# Patient Record
Sex: FEMALE | Race: WHITE
[De-identification: names, ages, dates, MRNs, and addresses within clinical notes are randomized per-mention and may not be internally consistent; named-entity substitution may affect disease eponyms.]

---

## 2018-05-06 ENCOUNTER — HOSPITAL ENCOUNTER (INPATIENT)
Dept: HOSPITAL 68 - ERH | Age: 53
LOS: 2 days | DRG: 517 | End: 2018-05-08
Attending: INTERNAL MEDICINE | Admitting: INTERNAL MEDICINE
Payer: COMMERCIAL

## 2018-05-06 VITALS — WEIGHT: 232 LBS | BODY MASS INDEX: 38.65 KG/M2 | HEIGHT: 65 IN

## 2018-05-06 DIAGNOSIS — R55: ICD-10-CM

## 2018-05-06 DIAGNOSIS — K21.9: ICD-10-CM

## 2018-05-06 DIAGNOSIS — D25.9: ICD-10-CM

## 2018-05-06 DIAGNOSIS — Z90.49: ICD-10-CM

## 2018-05-06 DIAGNOSIS — I95.9: ICD-10-CM

## 2018-05-06 DIAGNOSIS — D62: ICD-10-CM

## 2018-05-06 DIAGNOSIS — F32.9: ICD-10-CM

## 2018-05-06 DIAGNOSIS — I10: ICD-10-CM

## 2018-05-06 DIAGNOSIS — F41.9: ICD-10-CM

## 2018-05-06 DIAGNOSIS — E78.5: ICD-10-CM

## 2018-05-06 DIAGNOSIS — K76.0: ICD-10-CM

## 2018-05-06 DIAGNOSIS — N92.0: Primary | ICD-10-CM

## 2018-05-06 LAB
ABSOLUTE GRANULOCYTE CT: 5.9 /CUMM (ref 1.4–6.5)
APTT BLD: 27 SEC (ref 25–37)
BASOPHILS # BLD: 0 /CUMM (ref 0–0.2)
BASOPHILS NFR BLD: 0.4 % (ref 0–2)
EOSINOPHIL # BLD: 0.1 /CUMM (ref 0–0.7)
EOSINOPHIL NFR BLD: 0.7 % (ref 0–5)
ERYTHROCYTE [DISTWIDTH] IN BLOOD BY AUTOMATED COUNT: 14.8 % (ref 11.5–14.5)
GRANULOCYTES NFR BLD: 60.9 % (ref 42.2–75.2)
HCT VFR BLD CALC: 37.1 % (ref 37–47)
LYMPHOCYTES # BLD: 3.1 /CUMM (ref 1.2–3.4)
MCH RBC QN AUTO: 27.9 PG (ref 27–31)
MCHC RBC AUTO-ENTMCNC: 32.3 G/DL (ref 33–37)
MCV RBC AUTO: 86.4 FL (ref 81–99)
MONOCYTES # BLD: 0.5 /CUMM (ref 0.1–0.6)
PLATELET # BLD: 177 /CUMM (ref 130–400)
PMV BLD AUTO: 10.1 FL (ref 7.4–10.4)
PROTHROMBIN TIME: 12.1 SEC (ref 9.4–12.5)
RED BLOOD CELL CT: 4.3 /CUMM (ref 4.2–5.4)
WBC # BLD AUTO: 9.6 /CUMM (ref 4.8–10.8)

## 2018-05-06 PROCEDURE — P9016 RBC LEUKOCYTES REDUCED: HCPCS

## 2018-05-06 PROCEDURE — 0UDB7ZZ EXTRACTION OF ENDOMETRIUM, VIA NATURAL OR ARTIFICIAL OPENING: ICD-10-PCS | Performed by: OBSTETRICS & GYNECOLOGY

## 2018-05-06 NOTE — CT SCAN REPORT
EXAMINATION:
CT HEAD WITHOUT CONTRAST
 
CLINICAL INFORMATION:
Mental status change.
 
COMPARISON:
None
 
TECHNIQUE:
Contiguous axial imaging was performed from the skull base to vertex without
intravenous administration of contrast.
 
DLP:
633 mGy-cm
 
FINDINGS:
There is no evidence of acute intracranial hemorrhage or territorial
infarction. No abnormal mass effect or midline shift is seen. Gray to white
matter differentiation is well preserved. No extra-axial fluid collections
are identified.
 
The ventricles are normal in size. There is no significant abnormal
attenuation within the brain parenchyma. The osseous structures and soft
tissues demonstrate no acute abnormalities. The mastoid air cells and
visualized portions of the paranasal sinuses are well aerated.
 
IMPRESSION:
No acute intracranial pathology. No CT explanation for the patient's mental
status change.

## 2018-05-06 NOTE — ED SYNCOPE COMPLAINT
History of Present Illness
 
General
Chief Complaint: Syncope and Near-Syncope
Stated Complaint: BIBA SYNCOPE
Source: patient, family, EMS
Exam Limitations: no limitations
 
Vital Signs & Intake/Output
Vital Signs & Intake/Output
 Vital Signs
 
 
Date Time Temp Pulse Resp B/P B/P Pulse O2 O2 Flow FiO2
 
     Mean Ox Delivery Rate 
 
4       Room Air  
 
 2200 97.8 70 20 109/55  97 Room Air  
 
 
 ED Intake and Output
 
 
  0000  1200
 
Intake Total  
 
Output Total  
 
Balance  
 
   
 
Patient 240 lb 
 
Weight  
 
Weight Reported by Patient 
 
Measurement  
 
Method  
 
 
 
Allergies
Coded Allergies:
No Known Allergies (18)
 
Reconcile Medications
Atorvastatin Calcium 40 MG TABLET   1 TAB PO DAILY HLD  (Reported)
Desvenlafaxine Succinate (Pristiq ER) 50 MG TAB.ER.24H   1 TAB PO DAILY 
DEPRESSION  (Reported)
Pantoprazole Sodium 40 MG TABLET.DR   1 TAB PO DAILY GERD  (Reported)
Ranitidine (Ranitidine HCl) 150 MG TABLET   1 TAB PO BID GERD  (Reported)
 
Triage Nurses Notes Reviewed? yes
Timing: recent history
Precipitating Factors: "My period has been really heavy... started yesterday"
Context: sitting on sofa
Episode Description:
see below
Loss of Consciousness: brief (seconds)
Associated Symptoms: dizziness, nausea/vomiting, weakness
Pregnant: No
Patient currently breastfeeds: No
HPI:
54 yo woman
in prior good health
presents with syncope.
 
Per her , "we were sitting on the sofa and she just passed out."  She did
not stop breathing.  No cyanosis.  The event lasted a few minutes.  He called 
911.
 
She notes that she had a period start last night, "I am having a heavy period...
clots are coming out."
 
911 called.  When the ems crew went to have her stand, she became pale and 
briefly syncoped.  's in the field.
 
She has no recollection of the first syncopal event.  She does not recall 
feeling dizzy, chest pain, palpitations, dyspnea. 
 
She reports no pain and is otherwise well. 
 
Past History
 
Travel History
Traveled to [place] past 21 day No
 
Medical History
Any Pertinent Medical History? see below for history
 
Surgical History
Surgical History: none
 
Family History
Hx Contributory? No
 
Review of Systems
 
Review of Systems
Constitutional:
Reports: no symptoms. 
EENTM:
Reports: no symptoms. 
Respiratory:
Reports: no symptoms. 
Cardiovascular:
Reports: no symptoms. 
GI:
Reports: no symptoms. 
Genitourinary:
Reports: no symptoms. 
Musculoskeletal:
Reports: no symptoms. 
Skin:
Reports: no symptoms. 
Neurological/Psychological:
Reports: no symptoms. 
All Other Systems: Reviewed and Negative
 
Physical Exam
 
Physical Exam
General Appearance: well developed/nourished, no apparent distress
Head: atraumatic, normal appearance
Eyes:
Bilateral: normal appearance. 
Ears, Nose, Throat: normal pharynx, normal ENT inspection
Neck: normal inspection, supple, full range of motion
Respiratory: normal breath sounds, chest non-tender, no respiratory distress, 
quiet respiration, lungs clear
Cardiovascular: regular rate/rhythm
Gastrointestinal: normal bowel sounds, soft, non-tender
Back: normal inspection
Extremities: normal inspection
Psychiatric: awake, alert, oriented x 3
Cranial Nerves: normal hearing, normal speech
Skin: intact, normal color, warm/dry
 
Core Measures
ACS in differential dx? No
CVA/TIA Diagnosis: No
Sepsis Present: No
Sepsis Focused Exam Completed? No
 
Progress
Differential Diagnosis: vasodepressor syncope, anemia vs other. 
Plan of Care:
 Orders
 
 
Procedure Date/time Status
 
Nothing by Mouth  B Active
 
Patient Data 2329 Active
 
Saline Lock 2320 Active
 
Misc Message 2320 Active
 
ED Holding Orders 2320 Active
 
Admit to inpatient 2320 Active
 
Vital Signs 2320 Active
 
Code Status 2320 Active
 
Intake & Output 2234 Active
 
TROPONIN LEVEL 2147 Complete
 
PARTIAL THROMBOPLASTIN TIME 2147 Complete
 
PROTHROMBIN TIME 2147 Complete
 
LIPASE 2147 Complete
 
HEPATIC FUNCTION PANEL 2147 Complete
 
CBC WITHOUT DIFFERENTIAL 2147 Complete
 
BASIC METABOLIC PANEL 2147 Complete
 
AMYLASE 2147 Complete
 
EKG 2147 Active
 
TYPE & SCREEN (NOT X-MATCH) 2147 Complete
 
 
 Laboratory Tests
 
 
 
18:
Anion Gap 10, Estimated GFR > 60, BUN/Creatinine Ratio 21.7, Glucose 125  H, 
Calcium 9.0, Total Bilirubin 0.3, Direct Bilirubin 0.2, AST 27, ALT 35, Alkaline
Phosphatase 109, Troponin I < 0.01, Total Protein 6.1  L, Albumin 3.5, Amylase 
107, Lipase 218, PT 12.1, INR 1.11, APTT 27, CBC w Diff NO MAN DIFF REQ, RBC 
4.30, MCV 86.4, MCH 27.9, MCHC 32.3  L, RDW 14.8  H, MPV 10.1, Gran % 60.9, 
Lymphocytes % 32.5, Monocytes % 5.5, Eosinophils % 0.7, Basophils % 0.4, 
Absolute Granulocytes 5.9, Absolute Lymphocytes 3.1, Absolute Monocytes 0.5, 
Absolute Eosinophils 0.1, Absolute Basophils 0
 
Diagnostic Imaging:
Viewed by Me: Radiology Read, CT Scan.  Discussed w/RAD: Radiology Read, CT 
Scan. 
Radiology Impression: PATIENT: GONZALO OSULLIVAN  MEDICAL RECORD NO: [id number] 
PRESENT AGE: 53  PATIENT ACCOUNT NO: [id number] : 63  LOCATION: ER 
ORDERING PHYSICIAN: Dave Summers MD     SERVICE DATE:  
EXAM TYPE: CAT - CT HEAD WO IV CONTRAST EXAMINATION: CT HEAD WITHOUT CONTRAST 
CLINICAL INFORMATION: Mental status change. COMPARISON: None TECHNIQUE: 
Contiguous axial imaging was performed from the skull base to vertex without 
intravenous administration of contrast. DLP: 633 mGy-cm FINDINGS: There is no 
evidence of acute intracranial hemorrhage or territorial infarction. No abnormal
mass effect or midline shift is seen. Gray to white matter differentiation is 
well preserved. No extra-axial fluid collections are identified. The ventricles 
are normal in size. There is no significant abnormal attenuation within the 
brain parenchyma. The osseous structures and soft tissues demonstrate no acute 
abnormalities. The mastoid air cells and visualized portions of the paranasal 
sinuses are well aerated. IMPRESSION: No acute intracranial pathology. No CT 
explanation for the patient's mental status change. DICTATED BY: Moise Barajas MD  DATE/TIME DICTATED:18 :RAD.[facility]ARIO  DATE/TIME 
TRANSCRIBED:18 CONFIDENTIAL, DO NOT COPY WITHOUT APPROPRIATE 
AUTHORIZATION.  <Electronically signed in Other Vendor System>                  
                                                                     SIGNED BY: 
Moise Barajas MD 18
CXR Impression: PATIENT: GONZALO OSULLIVAN  MEDICAL RECORD NO: [id number] PRESENT 
AGE: 53  PATIENT ACCOUNT NO: [id number] : 61  LOCATION: [facility] ORDERING 
PHYSICIAN: Dave Summers MD     SERVICE DATE:  EXAM TYPE: 
RAD - XRY-PORTABLE CHEST XRAY EXAMINATION: XR PORTABLE CHEST CLINICAL 
INFORMATION: Chest pain COMPARISON: None TECHNIQUE: Portable frontal view of the
chest was obtained. FINDINGS: No significant abnormality is noted involving the 
heart, lungs, mediastinum, bony thorax or soft tissues. IMPRESSION: No acute 
pulmonary disease. DICTATED BY: Isidoro Dye MD  DATE/TIME DICTATED:18 :RAD.ROSARIO  DATE/TIME TRANSCRIBED:18 
CONFIDENTIAL, DO NOT COPY WITHOUT APPROPRIATE AUTHORIZATION.  <Electronically 
signed in Other Vendor System>                                                  
                                     SIGNED BY: Isidoro Dye MD 18 [id number]
Initial ED EKG: nsr, no acute changes. 
 
Departure
 
Departure
Disposition: STILL A PATIENT
Condition: Stable
Clinical Impression
Primary Impression: Syncope
Referrals:
Maciej Palomino MD (PCP/Family)
 
Departure Forms:
Customer Survey
General Discharge Information
Comments
18, 23:14... discussed with dr. ochoa (cards)... pt with syncope of 
uncertain etiology, merits admission, monitoring, serial ekg, cards eval. 
 
Admission Note
Spoke With:
Ale Simms MD
Documentation of Exam:
Documentation of any treatments & extenuating circumstances including Concerns 
Regarding Discharge (functional status, medication knowledge or non-compliance, 
living conditions, etc.) that warrant an admission rather than observation: 
 
pt with syncope of uncertain etiology, also with orthostasis, without 
compensatory pulse increase. 
pt merits monitoring, iv fluids, serial trop/ekg, cards eval... pt would also 
benefit from gyn eval for vaginal bleeding.

## 2018-05-06 NOTE — RADIOLOGY REPORT
EXAMINATION:
XR PORTABLE CHEST
 
CLINICAL INFORMATION:
Chest pain
 
COMPARISON:
None
 
TECHNIQUE:
Portable frontal view of the chest was obtained.
 
FINDINGS:
No significant abnormality is noted involving the heart, lungs, mediastinum,
bony thorax or soft tissues.
 
IMPRESSION:
No acute pulmonary disease.

## 2018-05-07 LAB
ABSOLUTE GRANULOCYTE CT: 10.3 /CUMM (ref 1.4–6.5)
ABSOLUTE GRANULOCYTE CT: 5.7 /CUMM (ref 1.4–6.5)
ABSOLUTE GRANULOCYTE CT: 6 /CUMM (ref 1.4–6.5)
BASOPHILS # BLD: 0 /CUMM (ref 0–0.2)
BASOPHILS NFR BLD: 0.3 % (ref 0–2)
BASOPHILS NFR BLD: 0.3 % (ref 0–2)
BASOPHILS NFR BLD: 0.4 % (ref 0–2)
EOSINOPHIL # BLD: 0 /CUMM (ref 0–0.7)
EOSINOPHIL NFR BLD: 0.1 % (ref 0–5)
EOSINOPHIL NFR BLD: 0.3 % (ref 0–5)
EOSINOPHIL NFR BLD: 0.3 % (ref 0–5)
ERYTHROCYTE [DISTWIDTH] IN BLOOD BY AUTOMATED COUNT: 14.3 % (ref 11.5–14.5)
ERYTHROCYTE [DISTWIDTH] IN BLOOD BY AUTOMATED COUNT: 14.3 % (ref 11.5–14.5)
ERYTHROCYTE [DISTWIDTH] IN BLOOD BY AUTOMATED COUNT: 14.5 % (ref 11.5–14.5)
GRANULOCYTES NFR BLD: 68.9 % (ref 42.2–75.2)
GRANULOCYTES NFR BLD: 69.4 % (ref 42.2–75.2)
GRANULOCYTES NFR BLD: 80.6 % (ref 42.2–75.2)
HCT VFR BLD CALC: 27.6 % (ref 37–47)
HCT VFR BLD CALC: 28.7 % (ref 37–47)
HCT VFR BLD CALC: 32.8 % (ref 37–47)
LYMPHOCYTES # BLD: 1.8 /CUMM (ref 1.2–3.4)
LYMPHOCYTES # BLD: 2 /CUMM (ref 1.2–3.4)
LYMPHOCYTES # BLD: 2 /CUMM (ref 1.2–3.4)
MCH RBC QN AUTO: 28 PG (ref 27–31)
MCH RBC QN AUTO: 28.3 PG (ref 27–31)
MCH RBC QN AUTO: 28.3 PG (ref 27–31)
MCHC RBC AUTO-ENTMCNC: 32.3 G/DL (ref 33–37)
MCHC RBC AUTO-ENTMCNC: 32.6 G/DL (ref 33–37)
MCHC RBC AUTO-ENTMCNC: 32.6 G/DL (ref 33–37)
MCV RBC AUTO: 86.6 FL (ref 81–99)
MCV RBC AUTO: 86.7 FL (ref 81–99)
MCV RBC AUTO: 86.8 FL (ref 81–99)
MONOCYTES # BLD: 0.5 /CUMM (ref 0.1–0.6)
MONOCYTES # BLD: 0.5 /CUMM (ref 0.1–0.6)
MONOCYTES # BLD: 0.6 /CUMM (ref 0.1–0.6)
PLATELET # BLD: 142 /CUMM (ref 130–400)
PLATELET # BLD: 145 /CUMM (ref 130–400)
PLATELET # BLD: 162 /CUMM (ref 130–400)
PMV BLD AUTO: 10.1 FL (ref 7.4–10.4)
PMV BLD AUTO: 10.3 FL (ref 7.4–10.4)
PMV BLD AUTO: 11.3 FL (ref 7.4–10.4)
RED BLOOD CELL CT: 3.18 /CUMM (ref 4.2–5.4)
RED BLOOD CELL CT: 3.31 /CUMM (ref 4.2–5.4)
RED BLOOD CELL CT: 3.78 /CUMM (ref 4.2–5.4)
WBC # BLD AUTO: 12.8 /CUMM (ref 4.8–10.8)
WBC # BLD AUTO: 8.2 /CUMM (ref 4.8–10.8)
WBC # BLD AUTO: 8.6 /CUMM (ref 4.8–10.8)

## 2018-05-07 PROCEDURE — 30233N1 TRANSFUSION OF NONAUTOLOGOUS RED BLOOD CELLS INTO PERIPHERAL VEIN, PERCUTANEOUS APPROACH: ICD-10-PCS | Performed by: OBSTETRICS & GYNECOLOGY

## 2018-05-07 NOTE — HISTORY & PHYSICAL
Frida Velasco MD 18 0715:
General Information and HPI
Source of Information: patient
Exam Limitations: no limitations
History of Present Illness:
Patient is a 53-year-old female with past medical history significant for 
menorrhagia and uterine fibroids, hypertension, hyperlipidemia, GERD, depression
, fatty liver disease, presenting this admission with chief complaint of 
syncope.
 
Patient reports that she started having her period one day prior to admission.  
Reports that she has had profuse bleeding and clotting requiring multiple pads 
at a time and requiring her to change her pads close to every hour.  Patient 
reports that she has heavy menses however this is much worse.  Patient states 
that her last.  Was 6 weeks ago and lasted approximately 2 weeks.  Patient 
reports along with the heavy bleeding she has had severe abdominal cramps.  Due 
to these cramps she states that she has had a poor appetite and has only had 
yogurt, ice cream, popcorn for the entire day.
 
Patient reports that this evening she was giving her son his Humira shot after 
which she felt dizzy and thought that she was having an anxiety attack.  Patient
states that her son told her she was quite lethargic at that time and was not 
responding.  Patient reports feeling dizzy and nauseous at which point she felt 
as though she was going to pass out and instructed her son to call 911.  Patient
states that after that she remembers seeing EMS who had asked her if she was 
able to get up to walk out to the ambulance.  At that point she stood up however
felt dizzy and sat back down and subsequently passed out.
 
Patient denies any chest pain or palpitations prior to passing out however did 
experience dizziness and warmth.  Patient reports that she has had similar 
episode in the past when she had diarrhea and was taking Lasix.  Patient is no 
longer on Lasix and is currently not on any antihypertensive medications as her 
blood pressure has been burning on the lower side.
 
Patient reports that she has a history of uterine fibroids for which she has 
seen a gynecologist and had an ultrasound showing multiple uterine fibroids of 
varying sizes.  States that she was given a medication to help however it caused
her to vomit and so she subsequently throughout the medication.  Patient reports
having hot flashes before her periods.  Reports that she is going through 
perimenopause.  Patient also reports that she has had D&Cs in the past due to 
heavy bleeding.  At this point a hysterectomy was held off as patient states 
that she is close to menopause and was told that her fibroids would improve 
after menopause.
 
Allergies/Medications
Allergies:
Coded Allergies:
No Known Allergies (18)
 
Home Med list
Atorvastatin Calcium 40 MG TABLET   1 TAB PO DAILY HLD  (Reported)
Desvenlafaxine Succinate (Pristiq ER) 50 MG TAB.ER.24H   1 TAB PO DAILY 
DEPRESSION  (Reported)
Docusate Sodium (Colace) 100 MG CAPSULE   1 CAP PO BID STOOL SOFTENER
Ferrous Sulfate 325 MG (65 MG IRON) TABLET   1 TAB PO BID IRON LEVEL
Ondansetron (Zofran Odt) 4 MG TAB.RAPDIS   1 TAB SL TID [place]
     .
Pantoprazole Sodium 40 MG TABLET.DR   1 TAB PO DAILY GERD  (Reported)
Ranitidine (Ranitidine HCl) 150 MG TABLET   1 TAB PO BID GERD  (Reported)
Tranexamic Acid (Lysteda) 650 MG TABLET   2 TAB PO TID Bleeding
     Please continue for 4 days.
 
 
Past History
 
Travel History
Traveled to [place] past 21 day No
 
Medical History
Blood Transfusion Hx: No
Neurological: NONE
EENT: NONE
Cardiovascular: hypertension, hyperlipidemia
Respiratory: NONE
Gastrointestinal: GERD
Hepatic: FATTY LIVER
Renal: NONE
Musculoskeletal: NONE
Psychiatric: anxiety, depression
Endocrine: NONE
Blood Disorders: anemia
Cancer(s): NONE
GYN/Reproductive: fibroid
Other Medical Hx:
fatty liver
History of MRSA: No
History of VRE: No
History of CDIFF: No
 
Surgical History
Surgical History: none, cholecystectomy, 
 
Past Family/Social History
 
Psychosocial History
Where do you live? Home
Smoking Status: Former Smoker
ETOH Use: denies use
Illicit Drug Use: denies illicit drug use
 
Review of Systems
 
Review of Systems
Constitutional:
Reports: see HPI. 
EENTM:
Reports: see HPI. 
Cardiovascular:
Reports: see HPI. 
Respiratory:
Reports: no symptoms. 
GI:
Reports: see HPI. 
Genitourinary:
Reports: no symptoms. 
Musculoskeletal:
Reports: no symptoms. 
Skin:
Reports: no symptoms. 
Neurological/Psychological:
Reports: see HPI. 
Hematologic/Endocrine:
Reports: see HPI. 
Immunologic/Allergic:
Reports: no symptoms. 
 
Exam & Diagnostic Data
Last 24 Hrs of Vital Signs/I&O
 Vital Signs
 
 
Date Time Temp Pulse Resp B/P B/P Pulse O2 O2 Flow FiO2
 
     Mean Ox Delivery Rate 
 
 0327 98.6 82 16 102/56  100 Room Air  
 
 0123  82  86/54     
 
 0012 98.2 88 18 82/57  99 Room Air  
 
 2234       Room Air  
 
 2200 97.8 70 20 109/55  97 Room Air  
 
 
 Intake & Output
 
 
  0800  0000  1600
 
Intake Total 3000  
 
Output Total 600  
 
Balance 2400  
 
    
 
Intake, IV 3000  
 
Output, Urine 600  
 
Patient 250 lb 240 lb 
 
Weight   
 
Weight  Reported by Patient 
 
Measurement   
 
Method   
 
 
 
 
Physical Exam
General Appearance Alert, Oriented X3, Cooperative, No Acute Distress
Skin No Rashes
Skin Temp/Moisture Exam: Warm/Dry
Sepsis Skin Exam (color): Normal for Ethnicity
HEENT Atraumatic, PERRLA, EOMI, Mucous Membr. moist/pink
Cardiovascular Regular Rate, Normal S1, Normal S2
Lungs Clear to Auscultation, Normal Air Movement
Abdomen Normal Bowel Sounds, Soft, No Tenderness
Neurological Normal Speech, Strength at 5/5 X4 Ext, Normal Tone, Sensation 
Intact, Cranial Nerves 3-12 NL, Reflexes 2+
Extremities No Clubbing, No Cyanosis, No Edema, Normal Pulses, No Tenderness/
Swelling
Vascular Normal Pulses, Pulses Symmetrical
Reproductive (FEMALE) Normal female genitalia, extensive bleeding and clotting 
on speculum exam seen in the vaginal canal
Pelvic (FEMALE) Appearance Normal, No Cervical Tenderness, clotting and bleeding
Last 24 Hrs of Labs/Dwight:
 Laboratory Tests
 
18 0545:
Anion Gap 4  L, Estimated GFR > 60, BUN/Creatinine Ratio 18.0, CBC w Diff 
Pending, WBC Pending, RBC Pending, Hgb Pending, Hct Pending, MCV Pending, MCH 
Pending, MCHC Pending, RDW Pending, Plt Count Pending, MPV Pending
 
18 0321:
Troponin I < 0.01
 
18 0053:
CBC w Diff NO MAN DIFF REQ, RBC 3.78  L, MCV 86.6, MCH 28.3, MCHC 32.6  L, RDW 
14.3, MPV 10.3, Gran % 80.6  H, Lymphocytes % 14.0  L, Monocytes % 5.0, 
Eosinophils % 0.1, Basophils % 0.3, Absolute Granulocytes 10.3  H, Absolute 
Lymphocytes 1.8, Absolute Monocytes 0.6, Absolute Eosinophils 0, Absolute 
Basophils 0
 
18 2159:
Anion Gap 10, Estimated GFR > 60, BUN/Creatinine Ratio 21.7, Glucose 125  H, 
Lactic Acid 1.8, Calcium 9.0, Phosphorus 3.1, Magnesium 1.8, Iron 76, TIBC 324, 
Ferritin 7.7  L, Total Bilirubin 0.3, Direct Bilirubin 0.2, AST 27, ALT 35, 
Alkaline Phosphatase 109, Troponin I < 0.01, Total Protein 6.1  L, Albumin 3.5, 
Amylase 107, Lipase 218, Vitamin B12 654, Folate > 20.0  H, TSH 2.420, Free T4 
0.81, Total Beta HCG NEGATIVE, PT 12.1, INR 1.11, APTT 27, CBC w Diff NO MAN 
DIFF REQ, RBC 4.30, MCV 86.4, MCH 27.9, MCHC 32.3  L, RDW 14.8  H, MPV 10.1, 
Gran % 60.9, Lymphocytes % 32.5, Monocytes % 5.5, Eosinophils % 0.7, Basophils %
0.4, Absolute Granulocytes 5.9, Absolute Lymphocytes 3.1, Absolute Monocytes 0.5
, Absolute Eosinophils 0.1, Absolute Basophils 0
 
 
Assessment/Plan
Assessment:
Patient is a 53-year-old female with past medical history significant for 
menorrhagia and uterine fibroids, hypertension, hyperlipidemia, GERD, depression
, fatty liver disease, presenting this admission with chief complaint of syncope
and vaginal bleeding. 
 
Patient will be admitted to telemetry for management of the followin. Acute blood loss anemia 2/2 Significant menorrhagia 
2. Syncope likely secondary to above
 
Plan: 
OB/GYN was consulted. Patient is currently undergoing D&C
Admit to telemetry with continuous telemetry monitoring after D&C 
Repeat CBC in AM 
Type and screen 
Tranfuse for H/H<8
Continue maintenance fluids with IV NS @ 150cc/hr after 3rd bolus is completed 
Serial EKG and troponin 
 
Diet: NPO pending D&C 
Code: Full code 
DVT PPx: ALPS only in setting of acute blood loss
 
 
As Ranked By This Provider
Problem List:
 1. Syncope
 
 2. Abnormal uterine bleeding
 
 
Core Measures/Misc ()
 
Acute Coronary Syndrome
ACS Diagnosis: No
 
Congestive Heart Failure
Congestive Heart Failure Diagnosis No
 
Cerebrovascular Accident
CVA/TIA Diagnosis: No
 
VTE (View Protocol)
VTE Risk Factors Age>40
No Mechanical VTE Prophylaxis d/t N/A MechProphylax Ordered
No VTE Pharm Prophylaxis d/t Bleeding (Active)
 
Sepsis (View protocol)
Sepsis Present: No
 
 
Nitin Zepeda 18 0715:
Resident Review Statement
Resident Statement: examined this patient, discussed with intern, agreed with 
intern, discussed with family, reviewed EMR data (avail), discussed with nursing
, discussed with case mgmt, reviewed images, amended to note
Other Findings:
This is 53-year-old female with medical history of hypertension not currently on
medication(was on Lasix 6 years ago), hyperlipidemia, GERD, uterine fibroid, 
depression.  Presented to the emergency department via EMS status post syncopal 
episode with loss of consciousness.  Patient stated that she had her period 
started yesterday that is happily bleeding associated with numerous amount of 
clot which is not her usual, patient stated that she had to change her pads more
frequent than usual she cannot remember how many time exactly but she stated 
that it's too many. Today she was sitting on couch when she became almost 
unconscious. EMS was called immediately, by the time patient regained her 
consciousness. They asked her if she could walk to ambulance when she passed out
again.  She reports feeling dizzy, nausea she denied vomiting, blurry vision or 
heart racing before passing out.  She stated that after she regained her 
consciousness she still felt dizzy and nauseous, and she reports heart racing 
without chest pain or difficulty breathing. Her last menstrual period was 6 week
back, lasted for 2 weeks. She is going through perimenopause. She stated that 
her BP always runs low(80s -90s SBP) and sometimes it is difficult to measure 
her BP when she follow up with her cardiologist or primary care and because of 
that her HTN medication was stopped. In the emergency department patient found 
to be orthostatic positive she received 2 L of normal saline, on pelvic vaginal 
exam large amount of blood and clot noted. 
 
 
***OB/GYN was contacted stat  came to the ED and she evaluate her and 
recommend D&C to be done immediately to help controlling the vaginal bleeding***
 
Physical examination, lab and imaging as above.
 
Problem list:
-Acute blood loss anemia due to significant vaginal bleeding
-Syncopal episode due to orthostatic hypotension secondary to above.
 
 
Plan:
-Admit patient to telemetry floor
-Vitals every shift, continuous blood pressure monitoring
-Type and crossmatch
-Continue IV fluid hydration to congestive pressure above 90.
-Keep patient nothing by mouth
-OB/GYN consulted patient will undergo D&C
-CBC every 8hr if she cont to bleed and keep hemoglobin above 8 
-Check orthostatics in a.m.
-Continue home medication
-Pain pathway
-DVT prophylaxis: Alps
-Full code
 
unconscious. EMS was called immediately, by the time patient regained her 
consciousness. They asked her if she could walk to ambulance when she passed out
again.  She reports feeling dizzy, nausea she denied vomiting, blurry vision or 
heart racing before passing out.  She stated that after she regained her 
consciousness she still felt dizzy and nauseous, and she reports heart racing 
without chest pain or difficulty breathing. Her last menstrual period was 6 week
back, lasted for 2 weeks. She is going through perimenopause. She stated that 
her BP always runs low(80s -90s SBP) and sometimes it is difficult to measure 
her BP when she follow up with her cardiologist or primary care and because of 
that her HTN medication was stopped. In the emergency department patient found 
to be orthostatic positive she received 2 L of normal saline, on pelvic vaginal 
exam large amount of blood and clot noted. 
 
 
***OB/GYN was contacted stat  came to the ED and she evaluate her and 
recommend D&C to be done immediately to help controlling the vaginal bleeding***
 
Physical examination, lab and imaging as above.
 
Problem list:
-Acute blood loss anemia due to significant vaginal bleeding
-Syncopal episode due to orthostatic hypotension secondary to above.
 
 
Plan:
-Admit patient to telemetry floor
-Vitals every shift, continuous blood pressure monitoring
-Type and crossmatch
-Continue IV fluid hydration to congestive pressure above 90.
-Keep patient nothing by mouth
-OB/GYN consulted patient will undergo D&C
-CBC every 8hr if she cont to bleed and keep hemoglobin above 8 
-Check orthostatics in a.m.
-Continue home medication
-Pain pathway
-DVT prophylaxis: Alps
-Full code

## 2018-05-07 NOTE — ULTRASOUND REPORT
EXAMINATION:
US PELVIS
 
CLINICAL INFORMATION:
Uterine fibroids. Menorrhagia
 
COMPARISON:
MRI of the pelvis from 08/03/2016
 
TECHNIQUE:
Transabdominal and transvaginal examination was performed.
 
FINDINGS:
 
LMP: 6-8 weeks ago.
 
The uterus is anteverted and is measured at 17.0 x 10.6 x 9.1 cm. Cervical
length is measured at 3.7 cm. The endometrium is measured at 2.2 cm in
thickness without documented hypervascularity.
 
There are a number of uterine fibroids documented. There is a left-sided
fundal fibroid measured at 4.7 x 4.6 x 4.0 cm. A second left-sided fibroid is
measured at 2.5 x 2.4 x 2.3 cm. A third left-sided fibroid is measured at 3.0
x 3.1 x 2.9 cm. A fourth fibroid in the lower uterine region is measured at
7.0 x 5.2 x 5.2 cm.
 
Scanning in both adnexa demonstrated no masses. Neither uterus is visualized.
 
IMPRESSION:
Numerous uterine fibroids.
 
Endometrial thickening measuring up to 2.2 cm. This may reflect endometrial
hyperplasia, neoplasia, or polyp.

## 2018-05-07 NOTE — CONS- OBGYN
General Information and HPI
 
Consulting Request
Date of Consult: 18
Requested By:
Ale Simms MD
 
Reason for Consult:
Heavy vaginal bleeding
Source of Information: patient
Exam Limitations: no limitations
History of Present Illness:
53-year-old, history of uterine fibroids, she was admitted for syncope episode. 
As per patient, she has regular menses,LMP yesterday, since yesterday she 
started to have heavy vaginal bleeding, gushing blood with clots, tonight she 
feels dizzy, sending by ambulance for evaluation.
History of uterine fibroids, declined surgery,  she was referred to [facility] for 
treatment , patient did not keep the appointment, she did not follow up with
GYN either.
 
Allergies/Medications
Allergies:
Coded Allergies:
No Known Allergies (18)
 
Home Med List:
Atorvastatin Calcium 40 MG TABLET   1 TAB PO DAILY HLD  (Reported)
Desvenlafaxine Succinate (Pristiq ER) 50 MG TAB.ER.24H   1 TAB PO DAILY 
DEPRESSION  (Reported)
Docusate Sodium (Colace) 100 MG CAPSULE   1 CAP PO BID STOOL SOFTENER
Ferrous Sulfate 325 MG (65 MG IRON) TABLET   1 TAB PO BID IRON LEVEL
Ondansetron (Zofran Odt) 4 MG TAB.RAPDIS   1 TAB SL TID [place]
     .
Pantoprazole Sodium 40 MG TABLET.DR   1 TAB PO DAILY GERD  (Reported)
Ranitidine (Ranitidine HCl) 150 MG TABLET   1 TAB PO BID GERD  (Reported)
Tranexamic Acid (Lysteda) 650 MG TABLET   2 TAB PO TID Bleeding
     Please continue for 4 days.
 
Current Medications:
 Current Medications
 
 
  Sig/Hazel Start time  Last
 
Medication Dose Route Stop Time Status Admin
 
Acetaminophen 650 MG Q6P PRN  0030 AC 
 
  PO   
 
Atorvastatin Calcium 40 MG 1700  1700 AC 
 
  PO   
 
Hydrocodone Bitart/ 1 TAB Q6P PRN  0030 AC 
 
Acetaminophen  PO   
 
Omeprazole 40 MG DAILY AC  0700 AC 
 
  PO   
 
Ondansetron HCl 4 MG Q8P PRN  0030 AC 
 
  IV   
 
Sodium Chloride 1,000 ML BOLUS ONE  0145 DC 
 
  IV  0244  0217
 
Sodium Chloride 1,000 ML Q10H  0045 AC 
 
  IV   0123
 
Sodium Chloride 1,000 ML BOLUS ONE  2200 DC 
 
  IV  2259  2237
 
Sodium Chloride 1,000 ML BOLUS ONE  2200 DC /
 
  IV  225  2300
 
 
 
 
Past History
 
Medical History
Neurological: NONE
EENT: NONE
Cardiovascular: hypertension, hyperlipidemia
Respiratory: NONE
Gastrointestinal: NONE
Hepatic: NONE
Renal: NONE
Musculoskeletal: NONE
Psychiatric: NONE
Endocrine: NONE
Blood Disorders: anemia
Cancer(s): NONE
GYN/Reproductive: fibroid
Other Medical Hx:
fatty liver
 
Surgical History
Pertinent Surgical History: cholecystectomy, , 1
 
Psychosocial History
Where Do You Live? Home
ETOH Use: denies use
Illicit Drug Use: denies illicit drug use
 
Review of Systems
 
Review of Systems
Constitutional:
Reports: no symptoms. 
EENTM:
Reports: no symptoms. 
Cardiovascular:
Reports: syncope. 
Respiratory:
Reports: no symptoms. 
GI:
Reports: no symptoms. 
Genitourinary:
Reports: see HPI. 
All Other Systems: Reviewed and Negative
 
Exam & Diagnostic Data
Vital Signs and I&O
Vital Signs
 
 
Date Time Temp Pulse Resp B/P B/P Pulse O2 O2 Flow FiO2
 
     Mean Ox Delivery Rate 
 
 0123  82  86/54     
 
 2234       Room Air  
 
 2200 36.6 70 20 109/55  97 Room Air  
 
 
 Intake & Output
 
 
  0800 / 0000 /06 1600 05/06 0800 05/06 0000 05/05 1600
 
Intake Total 3000     
 
Output Total 600     
 
Balance 2400     
 
       
 
Intake, IV 3000     
 
Output, Urine 600     
 
Patient  108.862 kg    
 
Weight      
 
Weight  Reported by Patient    
 
Measurement      
 
Method      
 
 
 
Physical Exam:
VSS
General: NAD
CV RRR
Lungs CTA B/L
Abdomen: soft, nontender. 
Ext: DCT (-)
 
Assessment/Plan
Assessment/Plan
54 yo, syncope , abnormal uterine bleeding, uterine fibroid
1.H/H stable, in light of menorrhagia, D+C for endometrial sampling and 
treatment for bleeding d/w pt, she understand and agreed. R/B/A of dilation and 
currettage d/w pt, she understand. all questions answered. informed consent 
obtained. 
2. prepare for OR, OR team called
3. pt admitted for observation by medicine team.
Problem List:
 1. Abnormal uterine bleeding
 
 2. Syncope
 
 3. Fibroids
 
Copies To:
Silvino Zhu MD
 
Consult Acknowledgment
- Thank you for your consult request.
 
Attending MD Review Statement
 
Attending Statement
Attending MD Statement: examined this patient, discussed w/nursing

## 2018-05-07 NOTE — EVENT NOTE
Event Note
Event Note:
**** p.t came back from the OR TO ICU for post-op recovary around 5:40 Am and 
her BP 80s over doppler, we will keep her in the ICU for close monitoring and we
increased IV fluid to 200cc/hr and will start IVF bolus if needed. she is awake,
alert, oriented X3.****
 
clot which is not her usual, patient stated that she had to change her pads more
frequent than usual she cannot remember how many time exactly but she stated 
that it's too many. Today she was sitting on couch when she became almost 
unconscious. EMS was called immediately, by the time patient regained her 
consciousness. They asked her if she could walk to ambulance when she passed out
again.  She reports feeling dizzy, nausea she denied vomiting, blurry vision or 
heart racing before passing out.  She stated that after she regained her 
consciousness she still felt dizzy and nauseous, and she reports heart racing 
without chest pain or difficulty breathing. Her last menstrual period was 6 week
back, lasted for 2 weeks. She is going through perimenopause. She stated that 
her BP always runs low(80s -90s SBP) and sometimes it is difficult to measure 
her BP when she follow up with her cardiologist or primary care and because of 
that her HTN medication was stopped. In the emergency department patient found 
to be orthostatic positive she received 2 L of normal saline, on pelvic vaginal 
exam large amount of blood and clot noted. 
 
 
***OB/GYN was contacted stat  came to the ED and she evaluate her and 
recommend D&C to be done immediately to help controlling the vaginal bleeding***
 
Problem list:
-Acute blood loss anemia due to significant vaginal bleeding
-Syncopal episode due to orthostatic hypotension secondary to above.
 
 
Plan:
-Admit patient to telemetry floor
-Vitals every shift, continuous blood pressure monitoring
-Type and crossmatch
-Continue IV fluid hydration to congestive pressure above 90.
-Keep patient nothing by mouth
-OB/GYN consulted patient will undergo D&C
-CBC every 8hr if she cont to bleed and keep hemoglobin above 8 
-Check orthostatics in a.m.
-Continue home medication
-Pain pathway
-DVT prophylaxis: Alps
-Full code
 
 
**** p.t came back from the OR around 5:40 Am and her BP 80s over doppler, we 
will keep her in the ICU for close monitoring and we increased IV fluid to 200cc
/hr and will start IVF bolus if needed. she is awake, alert, oriented X3.****

## 2018-05-07 NOTE — PN- OBGYN
Surgical Brief Attending Note
Brief Attending Note:
Pt s/p D&C this AM for menorrhagia, symptomatic anemia.
Pt began to have heavy vaginal bleeding again, clots, soaking through a pad an 
hour. 
Started on tranexamic acid this afternoon. 
Pt feeling better. No HA / dizziness / sob.
afeb, 80s, 90 - 100s / 50 - 60s
NAD
Abd soft nt obese, uterus palpable to just below umbilicus
Mya mod dark red vb on pad - last changed 4 hours ago
Ext nt
hgb 5/6 12 --> 5/7 10 --> 9
sono - enlarged fibroid uterus 17cm x 10cm x 9cm w/ multiple 2 - 5cm fibroids: 
em lining 2cm; ovaries obscured
A/P
52yo perimenopausal woman w/ uterine leiomyoma, menorrhagia and symptomatic 
anemia. POD 0 s/p D&C. VB improving. HD stable. Continue tranexamic acid q 8 hr,
monitor vb, check am cbc. Will f/u pathology. Medical and surgical options 
discussed with patient who is strongly considering [place] as outpt. Will cont to 
follow. Consult appreciated.

## 2018-05-07 NOTE — OPERATIVE REPORT
Operative/Inv Procedure Report
Surgery Date: 05/07/18
Name of Procedure:
Dilation and curettage
Pre-Operative Diagnosis:
Abnormal uterine bleeding
Post-Operative Diagnosis:
Same
Estimated Blood Loss: 50ml to 100ml
Surgeon/Assistant:
silvino Zhu MD
 
Anesthesia: moderate sedation
IV Fluids:
Lactated Ringer's
Urine Output:
Straight cath 100 mL clear urine at the beginning of the procedure
Specimens:
EMC
Complications:
None
Condition:
Stable
Operative Indication:
53-year-old, admitted for syncope episode, found to have heavy vaginal bleeding 
for 2 days.
 
Operative/Procedure Note
Note:
The patient was taken to the operating room where moderate sedation anesthesia 
was obtained without difficulty.  The patient was then examined under anesthesia
was found to have a largely anteverted uterus.  She was then placed in the 
dorsal lithotomy position and prepared and draped in the usual sterile fashion. 
A bivalve speculum was then placed in the patient's vagina and the anterior lip 
of the cervix grasped with the single-toothed tenaculum.  The uterus was then 
gently sounded to 8 cm, the cervix was dilated to accommodate a small sized 
sharp curetting.  A sharp curettage was then performed until a gritty texture 
was noted.  There was minimal bleeding noted and the tenaculum removed with good
hemostasis noted.  All the instruments withdrawn from the patient's vagina.  All
instruments and laps counts were correct.
 tolerated the procedure well.  The patient was then taken to the recovery 
room in stable condition.
Findings:
enlarged uterus with multiple fibroids. 
CC:
Silvino Zhu MD

## 2018-05-07 NOTE — PN- HOUSESTAFF
Deven SANTA,[facility] 18 0835:
Subjective
Follow-up For:
1. Acute blood loss anemia 2/2 Significant menorrhagia 
2. Syncope likely secondary to above
Tele-Events Since Last Visit:
Sinus Rhythm
No overnight events 
Subjective:
Patient resting comfortably in bed, systolic blood pressure in 70s but does not 
have any chest pain, palpitations, lightheadedness, dizziness or SOB. Continues 
to have Vaginal Bleeding but much less than before. No abdominal Pain, but 
reoprts cramps. 
 
Review of Systems
Constitutional:
Reports: no symptoms. 
EENTM:
Reports: no symptoms. 
Cardiovascular:
Reports: no symptoms. 
Respiratory:
Reports: no symptoms. 
Gastrointestinal:
Reports: no symptoms. 
Genitourinary:
Reports: no symptoms. 
Musculoskeletal:
Reports: no symptoms. 
Skin:
Reports: no symptoms. 
Neurological/Psychological:
Reports: no symptoms. 
Hematologic/Endocrine:
Reports: no symptoms. 
Immunologic/Allergic:
Reports: no symptoms. 
 
Objective
Last 24 Hrs of Vital Signs/I&O
 Vital Signs
 
 
Date Time Temp Pulse Resp B/P B/P Pulse O2 O2 Flow FiO2
 
     Mean Ox Delivery Rate 
 
 1138      100 Room Air  
 
 0327 98.6 82 16 102/56  100 Room Air  
 
 0123  82  86/54     
 
 0012 98.2 88 18 82/57  99 Room Air  
 
 2234       Room Air  
 
 2200 97.8 70 20 109/55  97 Room Air  
 
 
 Intake & Output
 
 
  1600  0800  0000
 
Intake Total  3000 
 
Output Total  600 
 
Balance  2400 
 
    
 
Intake, IV  3000 
 
Output, Urine  600 
 
Patient 262 lb 250 lb 240 lb
 
Weight   
 
Weight Bed scale  Reported by Patient
 
Measurement   
 
Method   
 
 
 
 
Physical Exam
General Appearance: Alert, Oriented X3, Cooperative, No Acute Distress
Skin: No Rashes, No Breakdown
HEENT: Atraumatic, PERRLA, EOMI
Cardiovascular: Regular Rate, Normal S1, Normal S2
Lungs: Clear to Auscultation, Normal Air Movement
Abdomen: Normal Bowel Sounds, Soft, No Tenderness
Extremities: No Clubbing, No Cyanosis, trace pedal edema bilaterally
Current Medications:
 Current Medications
 
 
  Sig/Hazel Start time  Last
 
Medication Dose Route Stop Time Status Admin
 
Acetaminophen 650 MG Q6P PRN  0030 AC 
 
  PO   
 
Atorvastatin Calcium 40 MG 1700  1700 AC 
 
  PO   
 
Chlorhexidine  0 .STK-MED  DC 
 
Gluconate    
 
Fentanyl Citrate 100 MCG .STK-MED  DC 
 
  IM  034  
 
Heparin Sodium  5,000 UNIT Q8  1446 AC 
 
(Porcine)  SC   
 
Hydrocodone Bitart/ 1 TAB Q6P PRN  0030 AC 
 
Acetaminophen  PO   
 
Methylergonovine  0.2 MG .STK-MED  DC 
 
Maleate  IM  08  
 
Midazolam HCl 2 MG .STK-MED  DC 
 
  IM  034  
 
Omeprazole 40 MG DAILY AC  0700 AC 
 
  PO   1122
 
Ondansetron HCl 4 MG Q8P PRN  0030 AC 
 
  IV   
 
Sodium Chloride 500 ML BOLUS ONE  0645 DC 
 
  IV  0744  0727
 
Sodium Chloride 1,000 ML BOLUS ONE  0145 DC 
 
  IV  0244  0217
 
Sodium Chloride 1,000 ML Q10H  0045 AC 
 
  IV   1123
 
Sodium Chloride 1,000 ML BOLUS ONE  2200 DC 05
 
  IV  2259  2237
 
Sodium Chloride 1,000 ML BOLUS ONE  2200 DC 05/
 
  IV / 2259  2300
 
Tranexamic Acid 1,000 MG TID  2100 AC 
 
  IV   
 
Tranexamic Acid 1,000 MG TIDPRN PRN  1045 DC 
 
  IV   1250
 
 
 
 
Last 24 Hrs of Lab/Dwight Results
Last 24 Hrs of Labs/Mics:
 Laboratory Tests
 
18 1100:
Troponin I < 0.01, CBC w Diff NO MAN DIFF REQ, RBC 3.18  L, MCV 86.7, MCH 28.0, 
MCHC 32.3  L, RDW 14.5, MPV 10.1, Gran % 68.9, Lymphocytes % 24.5, Monocytes % 
6.0, Eosinophils % 0.3, Basophils % 0.3, Absolute Granulocytes 5.7, Absolute 
Lymphocytes 2.0, Absolute Monocytes 0.5, Absolute Eosinophils 0, Absolute 
Basophils 0
 
18 0545:
Anion Gap 4  L, Estimated GFR > 60, BUN/Creatinine Ratio 18.0, CBC w Diff NO MAN
DIFF REQ, RBC 3.31  L, MCV 86.8, MCH 28.3, MCHC 32.6  L, RDW 14.3, MPV 11.3  H, 
Gran % 69.4, Lymphocytes % 23.7, Monocytes % 6.2, Eosinophils % 0.3, Basophils %
0.4, Absolute Granulocytes 6.0, Absolute Lymphocytes 2.0, Absolute Monocytes 0.5
, Absolute Eosinophils 0, Absolute Basophils 0
 
18 0321:
Troponin I < 0.01
 
18 0053:
CBC w Diff NO MAN DIFF REQ, RBC 3.78  L, MCV 86.6, MCH 28.3, MCHC 32.6  L, RDW 
14.3, MPV 10.3, Gran % 80.6  H, Lymphocytes % 14.0  L, Monocytes % 5.0, 
Eosinophils % 0.1, Basophils % 0.3, Absolute Granulocytes 10.3  H, Absolute 
Lymphocytes 1.8, Absolute Monocytes 0.6, Absolute Eosinophils 0, Absolute 
Basophils 0
 
18 0879:
Anion Gap 10, Estimated GFR > 60, BUN/Creatinine Ratio 21.7, Glucose 125  H, 
Lactic Acid 1.8, Calcium 9.0, Phosphorus 3.1, Magnesium 1.8, Iron 76, TIBC 324, 
Ferritin 7.7  L, Total Bilirubin 0.3, Direct Bilirubin 0.2, AST 27, ALT 35, 
Alkaline Phosphatase 109, Troponin I < 0.01, Total Protein 6.1  L, Albumin 3.5, 
Amylase 107, Lipase 218, Vitamin B12 654, Folate > 20.0  H, TSH 2.420, Free T4 
0.81, Total Beta HCG NEGATIVE, PT 12.1, INR 1.11, APTT 27, CBC w Diff NO MAN 
DIFF REQ, RBC 4.30, MCV 86.4, MCH 27.9, MCHC 32.3  L, RDW 14.8  H, MPV 10.1, 
Gran % 60.9, Lymphocytes % 32.5, Monocytes % 5.5, Eosinophils % 0.7, Basophils %
0.4, Absolute Granulocytes 5.9, Absolute Lymphocytes 3.1, Absolute Monocytes 0.5
, Absolute Eosinophils 0.1, Absolute Basophils 0
 
 
Assessment/Plan
Assessment:
Patient is a 53-year-old female with past medical history significant for 
menorrhagia and uterine fibroids, hypertension, hyperlipidemia, GERD, depression
, fatty liver disease, presenting this admission with chief complaint of syncope
and vaginal bleeding. 
 
Patient will be admitted to telemetry later transferred to ICU for management of
the followin. Acute blood loss anemia 2/2 Significant menorrhagia s/p D&C
2. Syncope likely secondary to above
3. Hypotension
4. chronic medical conditions. 
 
Plan: 
- Continue continuous telemetry monitoring 
- Repeat CBC in AM , Tranfuse for H/H<8
- Tranexamic acid TID for bleeding
- f/u Transvaginal US 
- APppreciate OB/Gyn recommendations. 
- Blood pressure improved with IV fluids with SBP in 80s. D/C IV fluids and 
continue to monitor vitals. 
- EKG and troponin x 3 negative.
- Continue home medications including Atorvastatin and pantoprazole. 
 
Diet: NPO pending D&C 
Code: Full code 
DVT PPx: ALPS only in setting of acute blood loss
Problem List:
 1. Abnormal uterine bleeding
 
 2. Syncope
 
Pain Ratin
Pain Location:
NA
Pain Goal: Remain pain free
Pain Plan:
Pain Pathway
Tomorrow's Labs & Rationales:
CBC
ICU bundle
 
 
Feliciano Macdonald MD 18:
Attending MD Review Statement
 
Attending Statement
Attending MD Statement: examined this patient, discuss w/resident/PA/NP, agreed 
w/resident/PA/NP, reviewed EMR data (avail), discussed with nursing, reviewed 
images, amended to note
Attending Assessment/Plan:
The patient was seen and discussed with house staff. Transvaginal US shows 
multiple uterine fibroids. Gyn follow-up appreciated. Patient considering 
arterial embolisation. Bleeding slowing in evening. Continue close follow-up of 
H/H, continue Tranexamic acid as per gyn.

## 2018-05-08 VITALS — SYSTOLIC BLOOD PRESSURE: 92 MMHG | DIASTOLIC BLOOD PRESSURE: 60 MMHG

## 2018-05-08 LAB
ABSOLUTE GRANULOCYTE CT: 2.8 /CUMM (ref 1.4–6.5)
BASOPHILS # BLD: 0 /CUMM (ref 0–0.2)
BASOPHILS NFR BLD: 0.6 % (ref 0–2)
EOSINOPHIL # BLD: 0.1 /CUMM (ref 0–0.7)
EOSINOPHIL NFR BLD: 2 % (ref 0–5)
ERYTHROCYTE [DISTWIDTH] IN BLOOD BY AUTOMATED COUNT: 14.7 % (ref 11.5–14.5)
GRANULOCYTES NFR BLD: 41.8 % (ref 42.2–75.2)
HCT VFR BLD CALC: 23.1 % (ref 37–47)
LYMPHOCYTES # BLD: 3.3 /CUMM (ref 1.2–3.4)
MCH RBC QN AUTO: 28.4 PG (ref 27–31)
MCHC RBC AUTO-ENTMCNC: 32.8 G/DL (ref 33–37)
MCV RBC AUTO: 86.5 FL (ref 81–99)
MONOCYTES # BLD: 0.4 /CUMM (ref 0.1–0.6)
PLATELET # BLD: 139 /CUMM (ref 130–400)
PMV BLD AUTO: 10.3 FL (ref 7.4–10.4)
RED BLOOD CELL CT: 2.67 /CUMM (ref 4.2–5.4)
WBC # BLD AUTO: 6.8 /CUMM (ref 4.8–10.8)

## 2018-05-08 NOTE — PN- RESIDENT CRCU
**See Addendum**
Subjective
HPI/CRCU Issues:
No issues overnight. Pt ok'ed by OBGYN to go home with outpatient follow up with
Dr. Zhu
24 Hour Events:
Heart rate 7288 sinus rhythm
Temperature: 98.09.7
Respiratory rate 1525
BP: 79/58 -> /60
I: 1760
O: 2150
 
Objective
 
Vital Signs & I&O
Last 8 Hrs of Vitals and I&O:
 Vital Signs
 
 
Date Time Temp Pulse Resp B/P B/P Pulse O2 O2 Flow FiO2
 
     Mean Ox Delivery Rate 
 
800      95 Room Air  
 
 07 98.6 74 20 92/60  95 Room Air  
 
 0400      96 Room Air  
 
 0000      95 Room Air  
 
 0000 98.4 81 18   95 Room Air  
 
 
 Intake & Output
 
 
  1600  0800 / 0000
 
Intake Total 1090 100 120
 
Output Total   1800
 
Balance 1090 100 -1680
 
    
 
Intake, Blood 350  
 
Product   
 
Intake,   
 
Intake, Oral 600 100 120
 
Output, Urine   1800
 
Patient  232 lb 
 
Weight   
 
Weight  Bed scale 
 
Measurement   
 
Method   
 
 
 Laboratory Tests
 
 
 
 
 0415 1800
 
Chemistry  
 
  Sodium (137 - 145 mmol/L) 140 
 
  Potassium (3.5 - 5.1 mmol/L) 3.7 
 
  Chloride (98 - 107 mmol/L) 110  H 
 
  Carbon Dioxide (22 - 30 mmol/L) 25 
 
  Anion Gap (5 - 16) 4  L 
 
  BUN (7 - 17 mg/dL) 6  L 
 
  Creatinine (0.5 - 1.0 mg/dL) 0.6 
 
  Estimated GFR (>60 ml/min) > 60 
 
  Glucose (65 - 99 mg/dL) 85 
 
  Calcium (8.4 - 10.2 mg/dL) 8.1  L 
 
  Phosphorus (2.5 - 4.5 mg/dL) 2.5 
 
  Magnesium (1.6 - 2.3 mg/dL) 1.9 
 
  Total Bilirubin (0.2 - 1.3 mg/dL) 0.2 
 
  AST (14 - 36 U/L) 18 
 
  ALT (9 - 52 U/L) 33 
 
  Albumin (3.5 - 5.0 g/dL) 2.5  L 
 
Hematology  
 
  CBC w Diff NO MAN DIFF REQ Cancelled
 
  WBC (4.8 - 10.8 /CUMM) 6.8 Cancelled
 
  RBC (4.20 - 5.40 /CUMM) 2.67  L Cancelled
 
  Hgb (12.0 - 16.0 G/DL) 7.6  L Cancelled
 
  Hct (37 - 47 %) 23.1  L Cancelled
 
  MCV (81.0 - 99.0 FL) 86.5 Cancelled
 
  MCH (27.0 - 31.0 PG) 28.4 Cancelled
 
  MCHC (33.0 - 37.0 G/DL) 32.8  L Cancelled
 
  RDW (11.5 - 14.5 %) 14.7  H Cancelled
 
  Plt Count (130 - 400 /CUMM) 139 Cancelled
 
  MPV (7.4 - 10.4 FL) 10.3 Cancelled
 
  Gran % (42.2 - 75.2 %) 41.8  L 
 
  Lymphocytes % (20.5 - 51.1 %) 49.0 
 
  Monocytes % (1.7 - 9.3 %) 6.6 
 
  Eosinophils % (0 - 5 %) 2.0 
 
  Basophils % (0.0 - 2.0 %) 0.6 
 
  Absolute Granulocytes (1.4 - 6.5 /CUMM) 2.8 
 
  Absolute Lymphocytes (1.2 - 3.4 /CUMM) 3.3 
 
  Absolute Monocytes (0.10 - 0.60 /CUMM) 0.4 
 
  Absolute Eosinophils (0.0 - 0.7 /CUMM) 0.1 
 
  Absolute Basophils (0.0 - 0.2 /CUMM) 0 
 
 
 Intake & Output
 
 
  1600
 
Intake Total 1090
 
Output Total 
 
Balance 1090
 
  
 
Intake, Blood 350
 
Product 
 
Intake, 
 
Intake, Oral 600
 
 
 
 
Exam
General Appearance: well developed/nourished, no apparent distress, alert, awake
Respiratory: decreased breath sounds
Cardiovascular: regular rate/rhythm, systolic murmur
Gastrointestinal: normal bowel sounds, soft, non-tender
Extremities: 1+ pitting edema
Current Medications:
 Current Medications
 
 
  Sig/Hazel Start time  Last
 
Medication Dose Route Stop Time Status Admin
 
Acetaminophen 650 MG Q6P PRN  0030 DCD 
 
  PO   1517
 
Atorvastatin Calcium 40 MG 1700  1700 DCD 05
 
  PO   1618
 
Desvenlafaxine  50 MG DAILY  1630 DCD 
 
Succinate  PO   0946
 
Heparin Sodium  5,000 UNIT Q8  1446 DCD 
 
(Porcine)  SC   1518
 
Hydrocodone Bitart/ 1 TAB Q6P PRN  0030 DCD 
 
Acetaminophen  PO   
 
Magnesium Oxide 400 MG ONE ONE  0900 DC 
 
  PO  0901  0943
 
Omeprazole 40 MG DAILY AC  0700 DCD 
 
  PO   0609
 
Ondansetron HCl 4 MG ONCE ONE  1400 DC 05/
 
  IV  1401  1448
 
Ondansetron HCl 4 MG Q8P PRN  0030 DCD 
 
  IV   1037
 
Potassium Chloride 40 MEQ ONCE ONE  0900 DC 05/
 
  PO  0901  0946
 
Tranexamic Acid 1,000 MG TID  2100 DCD 05/
 
  IV   1404
 
 
 
 
Impression/Plan
 
Impression/Problem List
Impression:
53-year-old female with past medical history significant for menorrhagia and 
uterine fibroids, hypertension, hyperlipidemia, GERD, depression, fatty liver 
disease, presenting this admission with chief complaint of syncope and vaginal 
bleeding s/p D+C. 
 
Patient was admitted to telemetry and transferred to ICU for management of the 
followin. Acute blood loss anemia 2/2 Significant menorrhagia s/p D&C
2. Syncope likely secondary to vaginal bleed
3. Hypotension
4. chronic medical conditions. 
 
Respiratory: No issues
Infectious: No Issues
Cardiac
# Acute blood loss anemia 2/2 Significant menorrhagia s/p D&C
US revealed multiple fibroids and endometrial thickening measuring up to 2.2 cm 
(may reflect endometrial hyperplasia, neoplasia, or polyp). She is s/p D+C 
yesterday. Has received 1 unit prbc thus far
-Pt has been clear by OB/GYN for discharge with follow-up for [place] as outpt
-Patient discharged with iron twice a day and tranexamic acid x4 days
-Discharge with Zofran for nausea
 
#Syncope episode likely secondary to vaginal bleed vs ACS due to chest pain
EKG and trops negative for ACS. CXR was negative for any cause of chest pain. 
Head Ct was negative
 
#Hypotension
BP improved after oral intake and transfusion
Heme onc: No issues
 
#hx of hld
-cont atorvastatin
 
OBGYN:
Please refer to above regarding her acute blood loss anemia
 
Metabolic:
-repleted electrolytes as needed
 
Alimentary: 
 
#Gerd
-cont omeprazole
 
Neuro:
 
#mental health
-cont pristiq
 
 
Houskeeping:
FULL CODE
Regular diet
Problem List:
 1. Fibroids
 
 2. Abnormal uterine bleeding
 
 3. Syncope
 
Pain Ratin
Tomorrow's Labs & Rationales:
none
 
Plan
DVT/Prophylaxis: heparin subq

## 2018-05-08 NOTE — PATIENT DISCHARGE INSTRUCTIONS
Discharge Instructions
 
General Discharge Information
You were seen/treated for:
Vaginal Bleeding
Syncope
Special Instructions:
Please follow up with Dr. Zhu in 1 week.
 
Please follow up with your pcp in 1 week.
 
 
Acute Coronary Syndrome
 
Inclusion Criteria
At DC or during hospital stay patient has or had the following:
ACS DIAGNOSIS No
 
Discharge Core Measures
Meds if any: Prescribed or Continued at Discharge
Meds if any: NOT Prescribed or Continued at Discharge
 
Congestive Heart Failure
 
Inclusion Criteria
At DC or during hospital stay patient has or had the following:
CHF DIAGNOSIS No
 
Discharge Core Measures
Meds if any: Prescribed or Continued at Discharge
Meds if any: NOT Prescribed or Continued at Discharge
 
Cerebrovascular accident
 
Inclusion Criteria
At DC or during hospital stay patient has or had the following:
CVA/TIA Diagnosis No
 
Discharge Core Measures
Meds if any: Prescribed or Continued at Discharge
Meds if any: NOT Prescribed or Continued at Discharge
 
Venous thromboembolism
 
Inclusion Criteria
VTE Diagnosis No
VTE Type NONE
VTE Confirmed by (Test) NONE
 
Discharge Core Measures
- Per Current guidelines, there needs to be overlap
- treatment for the first 5 days of Warfarin therapy.
- If discharged on Warfarin prior to 5 days of
- overlap therapy, the patient will need to be
- assessed for post discharge needs including
- *Post discharge parental anticoagulation
- *Warfarin and/or parental anticoagulation education
- *Follow up date to check INR post discharge
At least 5 days overlap therapy as Inpatient No
Meds if any: Prescribed or Continued at Discharge
Note: Overlap Therapy is Warfarin and Anticoagulant
Meds if any: NOT Prescribed or Continued at Discharge

## 2018-05-08 NOTE — PN- OBGYN
Surgical Brief Attending Note
Brief Attending Note:
Pt is POD #2 vss afebrile S/P transfusion one unit PRBC for menorrhagia 
secondary to fibroid uterus.
Pt states bleeding is now light flow and brown in color.
Imp-fibroid uterus in a preimenipausal obese pt. responded to Tranexamic acid 
I have spoken to intervential radiologist who will contact pt regurding Uterine 
artery ablation as an out patient.
Recommend discharge home on FeSO4 and Tranexamic acid

## 2025-03-04 NOTE — ADMISSION CERTIFICATION
Admission Certification
 
Certification Statement
- As attending physician, I certify that at the time of
- admission, based on clinical presentation, severity of
- symptoms, need for further diagnostic testing and
- therapeutic interventions, and risk of adverse outcomes
- without in-hospital treatment, in my clinical assessment,
- this patient requires an acute hospital stay for a minimum
- of two nights or longer. I have also considered psychsocial
- factors such as support system, advanced age, financial
- issues, cognitive issues, and failed out-patient treatments,
- past re-admission history, safety of patient, and lack of
- compliance as applicable.
Specific rationale supporting this admission is:
Syncope secondary to volume loss, acute iron deficiency anemia, vaginal bleeding
Yolanda Berry(Attending)